# Patient Record
(demographics unavailable — no encounter records)

---

## 2025-04-03 NOTE — PHYSICAL EXAM
[No Acute Distress] : no acute distress [Well Nourished] : well nourished [Normal Oropharynx] : the oropharynx was normal [Normal TMs] : both tympanic membranes were normal [Normal Nasal Mucosa] : the nasal mucosa was normal [No Lymphadenopathy] : no lymphadenopathy [Supple] : supple [Thyroid Normal, No Nodules] : the thyroid was normal and there were no nodules present [No Accessory Muscle Use] : no accessory muscle use [Clear to Auscultation] : lungs were clear to auscultation bilaterally [Regular Rhythm] : with a regular rhythm [Normal S1, S2] : normal S1 and S2 [No Murmur] : no murmur heard [No Edema] : there was no peripheral edema [Normal Appearance] : normal in appearance [No Nipple Discharge] : no nipple discharge [No Axillary Lymphadenopathy] : no axillary lymphadenopathy [PERRL] : pupils equal round and reactive to light [Soft] : abdomen soft [Non Tender] : non-tender [Non-distended] : non-distended [No Masses] : no abdominal mass palpated [No HSM] : no HSM [Normal Bowel Sounds] : normal bowel sounds [Normal Supraclavicular Nodes] : no supraclavicular lymphadenopathy [Normal Axillary Nodes] : no axillary lymphadenopathy [Normal Posterior Cervical Nodes] : no posterior cervical lymphadenopathy [Normal Anterior Cervical Nodes] : no anterior cervical lymphadenopathy [Normal Affect] : the affect was normal [Normal Insight/Judgement] : insight and judgment were intact [de-identified] : normal color and pigmentation of nevi ; states after fell 3 years ago had bruise at b/l buttocks and now has indentation (no palpable masses)

## 2025-04-03 NOTE — ASSESSMENT
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, obesity, history of anxiety, seasonal allergies, history of cough variant [place] here for cpe  ghm - check fasting bw - will submit ua after finishes period  ecg performed during wellness exam to monitor for any cardiac condition - due for gyn and pap smear utd with dental pt will see dermatology for tendency for ingrown hair & n states after fell 3 years ago had bruise at b/l buttocks and now has indentation (no palpable masses), likely had fat necrosis that resolved advised tdap shot - givent cali history of daily marijuana use and anxiety - advised to see a therapist   rtc in 1year for cpe or prn

## 2025-04-03 NOTE — HISTORY OF PRESENT ILLNESS
[FreeTextEntry1] :  cpe  [de-identified] : last cpe ~2022   diet: eating healthier exercise: back to running and exercise last year had 2 surgeries for urachal cyst remnant excision